# Patient Record
Sex: MALE | Race: WHITE
[De-identification: names, ages, dates, MRNs, and addresses within clinical notes are randomized per-mention and may not be internally consistent; named-entity substitution may affect disease eponyms.]

---

## 2020-06-25 ENCOUNTER — HOSPITAL ENCOUNTER (EMERGENCY)
Dept: HOSPITAL 8 - ED | Age: 20
Discharge: HOME | End: 2020-06-25
Payer: MEDICAID

## 2020-06-25 VITALS — DIASTOLIC BLOOD PRESSURE: 93 MMHG | SYSTOLIC BLOOD PRESSURE: 169 MMHG

## 2020-06-25 VITALS — BODY MASS INDEX: 40.75 KG/M2 | HEIGHT: 69 IN | WEIGHT: 275.14 LBS

## 2020-06-25 DIAGNOSIS — F17.200: ICD-10-CM

## 2020-06-25 DIAGNOSIS — R05: ICD-10-CM

## 2020-06-25 DIAGNOSIS — J02.9: Primary | ICD-10-CM

## 2020-06-25 DIAGNOSIS — R06.02: ICD-10-CM

## 2020-06-25 DIAGNOSIS — R42: ICD-10-CM

## 2020-06-25 DIAGNOSIS — R00.0: ICD-10-CM

## 2020-06-25 PROCEDURE — 87081 CULTURE SCREEN ONLY: CPT

## 2020-06-25 PROCEDURE — 71045 X-RAY EXAM CHEST 1 VIEW: CPT

## 2020-06-25 PROCEDURE — 99285 EMERGENCY DEPT VISIT HI MDM: CPT

## 2020-06-25 PROCEDURE — 93005 ELECTROCARDIOGRAM TRACING: CPT

## 2020-06-25 PROCEDURE — 87880 STREP A ASSAY W/OPTIC: CPT

## 2020-06-25 NOTE — NUR
PT WALKED BACK FROM TRIAGE WITH CHIEF COMPLAINT OF SORE THROAT, HA, NASAL 
CONGESTION,, INITERMITTENT N/V FOR 1 WEEK.

## 2021-07-02 ENCOUNTER — HOSPITAL ENCOUNTER (EMERGENCY)
Facility: MEDICAL CENTER | Age: 21
End: 2021-07-02
Attending: EMERGENCY MEDICINE
Payer: MEDICAID

## 2021-07-02 ENCOUNTER — APPOINTMENT (OUTPATIENT)
Dept: RADIOLOGY | Facility: MEDICAL CENTER | Age: 21
End: 2021-07-02
Attending: EMERGENCY MEDICINE
Payer: MEDICAID

## 2021-07-02 VITALS
HEART RATE: 95 BPM | DIASTOLIC BLOOD PRESSURE: 100 MMHG | TEMPERATURE: 97.7 F | WEIGHT: 276.68 LBS | RESPIRATION RATE: 16 BRPM | SYSTOLIC BLOOD PRESSURE: 154 MMHG | OXYGEN SATURATION: 96 %

## 2021-07-02 DIAGNOSIS — R03.0 ELEVATED BLOOD PRESSURE READING: ICD-10-CM

## 2021-07-02 DIAGNOSIS — E87.6 HYPOKALEMIA: ICD-10-CM

## 2021-07-02 DIAGNOSIS — G47.00 INSOMNIA, UNSPECIFIED TYPE: ICD-10-CM

## 2021-07-02 LAB
ALBUMIN SERPL BCP-MCNC: 4.4 G/DL (ref 3.2–4.9)
ALBUMIN/GLOB SERPL: 1.6 G/DL
ALP SERPL-CCNC: 73 U/L (ref 30–99)
ALT SERPL-CCNC: 53 U/L (ref 2–50)
ANION GAP SERPL CALC-SCNC: 13 MMOL/L (ref 7–16)
AST SERPL-CCNC: 32 U/L (ref 12–45)
BASOPHILS # BLD AUTO: 0.2 % (ref 0–1.8)
BASOPHILS # BLD: 0.02 K/UL (ref 0–0.12)
BILIRUB SERPL-MCNC: 0.3 MG/DL (ref 0.1–1.5)
BUN SERPL-MCNC: 8 MG/DL (ref 8–22)
CALCIUM SERPL-MCNC: 9.6 MG/DL (ref 8.5–10.5)
CHLORIDE SERPL-SCNC: 105 MMOL/L (ref 96–112)
CO2 SERPL-SCNC: 25 MMOL/L (ref 20–33)
CREAT SERPL-MCNC: 0.72 MG/DL (ref 0.5–1.4)
EKG IMPRESSION: NORMAL
EOSINOPHIL # BLD AUTO: 0.08 K/UL (ref 0–0.51)
EOSINOPHIL NFR BLD: 0.7 % (ref 0–6.9)
ERYTHROCYTE [DISTWIDTH] IN BLOOD BY AUTOMATED COUNT: 41.3 FL (ref 35.9–50)
GLOBULIN SER CALC-MCNC: 2.8 G/DL (ref 1.9–3.5)
GLUCOSE SERPL-MCNC: 107 MG/DL (ref 65–99)
HCT VFR BLD AUTO: 44 % (ref 42–52)
HGB BLD-MCNC: 15.1 G/DL (ref 14–18)
IMM GRANULOCYTES # BLD AUTO: 0.06 K/UL (ref 0–0.11)
IMM GRANULOCYTES NFR BLD AUTO: 0.5 % (ref 0–0.9)
LYMPHOCYTES # BLD AUTO: 2.49 K/UL (ref 1–4.8)
LYMPHOCYTES NFR BLD: 22.8 % (ref 22–41)
MCH RBC QN AUTO: 31.1 PG (ref 27–33)
MCHC RBC AUTO-ENTMCNC: 34.3 G/DL (ref 33.7–35.3)
MCV RBC AUTO: 90.7 FL (ref 81.4–97.8)
MONOCYTES # BLD AUTO: 1.07 K/UL (ref 0–0.85)
MONOCYTES NFR BLD AUTO: 9.8 % (ref 0–13.4)
NEUTROPHILS # BLD AUTO: 7.22 K/UL (ref 1.82–7.42)
NEUTROPHILS NFR BLD: 66 % (ref 44–72)
NRBC # BLD AUTO: 0 K/UL
NRBC BLD-RTO: 0 /100 WBC
NT-PROBNP SERPL IA-MCNC: 13 PG/ML (ref 0–125)
PLATELET # BLD AUTO: 288 K/UL (ref 164–446)
PMV BLD AUTO: 10.9 FL (ref 9–12.9)
POTASSIUM SERPL-SCNC: 3.5 MMOL/L (ref 3.6–5.5)
PROT SERPL-MCNC: 7.2 G/DL (ref 6–8.2)
RBC # BLD AUTO: 4.85 M/UL (ref 4.7–6.1)
SODIUM SERPL-SCNC: 143 MMOL/L (ref 135–145)
TROPONIN T SERPL-MCNC: 7 NG/L (ref 6–19)
WBC # BLD AUTO: 10.9 K/UL (ref 4.8–10.8)

## 2021-07-02 PROCEDURE — 84484 ASSAY OF TROPONIN QUANT: CPT

## 2021-07-02 PROCEDURE — 85025 COMPLETE CBC W/AUTO DIFF WBC: CPT

## 2021-07-02 PROCEDURE — 71275 CT ANGIOGRAPHY CHEST: CPT

## 2021-07-02 PROCEDURE — 93005 ELECTROCARDIOGRAM TRACING: CPT | Performed by: EMERGENCY MEDICINE

## 2021-07-02 PROCEDURE — 83880 ASSAY OF NATRIURETIC PEPTIDE: CPT

## 2021-07-02 PROCEDURE — 80053 COMPREHEN METABOLIC PANEL: CPT

## 2021-07-02 PROCEDURE — 99284 EMERGENCY DEPT VISIT MOD MDM: CPT

## 2021-07-02 PROCEDURE — 71045 X-RAY EXAM CHEST 1 VIEW: CPT

## 2021-07-02 PROCEDURE — 700117 HCHG RX CONTRAST REV CODE 255: Performed by: EMERGENCY MEDICINE

## 2021-07-02 RX ADMIN — IOHEXOL 100 ML: 350 INJECTION, SOLUTION INTRAVENOUS at 16:51

## 2021-07-02 ASSESSMENT — LIFESTYLE VARIABLES: DO YOU DRINK ALCOHOL: NO

## 2021-07-02 NOTE — ED TRIAGE NOTES
Chief Complaint   Patient presents with   • Cough     pt states sx for 2 weeks   • Fatigue     pt states he just feels tired all the time     Pt placed in Sr lounge

## 2021-07-02 NOTE — ED PROVIDER NOTES
"ED Provider Note    CHIEF COMPLAINT  Chief Complaint   Patient presents with   • Cough     pt states sx for 2 weeks   • Fatigue     pt states he just feels tired all the time       HPI  Shar Anglin is a 21 y.o. male here with his father who states \"I am okay.\"  Patient's father states the patient has not slept for five or six nights and is up pacing and appears anxious.  The patient states he is fine and wishes to go home.  He states he has been anxious secondary to stress that he does not wish to discuss.  He denies stimulant/drug use.  He admits to marijuana use.  He denies any physical pain or complaints such as chest pain and shortness of breath.  He states he receives injections for psychiatric medications and believes he is due in the next couple of weeks.    Patient denies hallucinations, SI and HI.    ALLERGIES  Allergies   Allergen Reactions   • Cefdinir Rash     omnicef       CURRENT MEDICATIONS  Denies    PAST MEDICAL HISTORY   has a past medical history of Migraine.    SURGICAL HISTORY  patient denies any surgical history    SOCIAL HISTORY  Here with his father  Admits to marijuana use  Denies alcohol, tobacco, and methamphetamine use      REVIEW OF SYSTEMS  See HPI for further details.  All other systems are negative except as above in HPI.    PHYSICAL EXAM  VITAL SIGNS: BP (!) 175/110   Pulse (!) 117   Temp 36.5 °C (97.7 °F) (Temporal)   Resp 12   Wt (!) 126 kg (276 lb 10.8 oz)   SpO2 96%     General:  WDWN male, nontoxic appearing in NAD; A+Ox3; V/S as above; elevated blood pressure, tachycardic at triage but not on my exam  Skin: warm and dry; good color; no rash  HEENT: NCAT; EOMs intact; PERRL; no scleral icterus   Neck: FROM; soft  Cardiovascular: Regular heart rate and rhythm.  No murmurs, rubs, or gallops  Lungs: Clear to auscultation with good air movement bilaterally.  No wheezes, rhonchi, or rales.   Extremities: QUICK x 4; no e/o trauma; no pedal edema  Neurologic: CNs III-XII " grossly intact; speech clear; distal sensation intact; strength 5/5 UE/LEs; gait steady  Psychiatric: Appropriate affect, normal mood    LABS  Results for orders placed or performed during the hospital encounter of 07/02/21   CBC WITH DIFFERENTIAL   Result Value Ref Range    WBC 10.9 (H) 4.8 - 10.8 K/uL    RBC 4.85 4.70 - 6.10 M/uL    Hemoglobin 15.1 14.0 - 18.0 g/dL    Hematocrit 44.0 42.0 - 52.0 %    MCV 90.7 81.4 - 97.8 fL    MCH 31.1 27.0 - 33.0 pg    MCHC 34.3 33.7 - 35.3 g/dL    RDW 41.3 35.9 - 50.0 fL    Platelet Count 288 164 - 446 K/uL    MPV 10.9 9.0 - 12.9 fL    Neutrophils-Polys 66.00 44.00 - 72.00 %    Lymphocytes 22.80 22.00 - 41.00 %    Monocytes 9.80 0.00 - 13.40 %    Eosinophils 0.70 0.00 - 6.90 %    Basophils 0.20 0.00 - 1.80 %    Immature Granulocytes 0.50 0.00 - 0.90 %    Nucleated RBC 0.00 /100 WBC    Neutrophils (Absolute) 7.22 1.82 - 7.42 K/uL    Lymphs (Absolute) 2.49 1.00 - 4.80 K/uL    Monos (Absolute) 1.07 (H) 0.00 - 0.85 K/uL    Eos (Absolute) 0.08 0.00 - 0.51 K/uL    Baso (Absolute) 0.02 0.00 - 0.12 K/uL    Immature Granulocytes (abs) 0.06 0.00 - 0.11 K/uL    NRBC (Absolute) 0.00 K/uL   CMP   Result Value Ref Range    Sodium 143 135 - 145 mmol/L    Potassium 3.5 (L) 3.6 - 5.5 mmol/L    Chloride 105 96 - 112 mmol/L    Co2 25 20 - 33 mmol/L    Anion Gap 13.0 7.0 - 16.0    Glucose 107 (H) 65 - 99 mg/dL    Bun 8 8 - 22 mg/dL    Creatinine 0.72 0.50 - 1.40 mg/dL    Calcium 9.6 8.5 - 10.5 mg/dL    AST(SGOT) 32 12 - 45 U/L    ALT(SGPT) 53 (H) 2 - 50 U/L    Alkaline Phosphatase 73 30 - 99 U/L    Total Bilirubin 0.3 0.1 - 1.5 mg/dL    Albumin 4.4 3.2 - 4.9 g/dL    Total Protein 7.2 6.0 - 8.2 g/dL    Globulin 2.8 1.9 - 3.5 g/dL    A-G Ratio 1.6 g/dL   TROPONIN   Result Value Ref Range    Troponin T 7 6 - 19 ng/L   proBrain Natriuretic Peptide, NT   Result Value Ref Range    NT-proBNP 13 0 - 125 pg/mL   ESTIMATED GFR   Result Value Ref Range    GFR If African American >60 >60 mL/min/1.73 m 2    GFR  If Non African American >60 >60 mL/min/1.73 m 2   EKG   Result Value Ref Range    Report       [facility] Emergency Dept.    Test Date:  2021  Pt Name:    SHAR GRANGER            Department: ER  MRN:        [id number]                      Room:       [facility]  Gender:     Male                         Technician: [id number]  :        2000                   Requested By:DEMOND DEL TORO  Order #:    [id number]                    Reading MD: DEMOND DEL TORO MD    Measurements  Intervals                                Axis  Rate:       96                           P:          35  WV:         148                          QRS:        55  QRSD:       104                          T:          6  QT:         360  QTc:        455    Interpretive Statements  SINUS RHYTHM  No previous ECG available for comparison  t wave inversion III  non specific ST changes laterally  Electronically Signed On 2021 16:19:01 PDT by DEMOND DEL TORO MD       IMAGING  CT-CTA CHEST PULMONARY ARTERY W/ RECONS   Final Result      1.  There is no CT evidence of acute pulmonary embolism.   2.  Negative CT of the chest.            DX-CHEST-PORTABLE (1 VIEW)   Final Result      No acute cardiopulmonary findings.          MEDICAL RECORD  I have reviewed patient's medical record and pertinent results are listed below.      COURSE & MEDICAL DECISION MAKING  I have reviewed any medical record information, laboratory studies and radiographic results as noted.    Shar Granger is a 21 y.o. male who presents complaining of difficulty sleeping.  The patient is unwilling to really answer much of my history questions.  He does deny SI, HI, and hallucinations.  He denies stimulant use such as cocaine and methamphetamines.  His father accompanies him and asks that I give him something to help him sleep.  I advised that I would like to discover and treat the etiology of the insomnia rather than just give a general pill for  insomnia. The patient declined to give us a urine sample while here.  It is unclear why.  We will discharge with return precautions.  I have advised them to be evaluated at [facility] where he has been before.    Appropriate PPE was worn at all times while interacting with the patient.    Protocol labs and CT imaging were ordered before interviewing the patient.  The patient's vitals were concerning to me with elevated blood pressure and tachycardia.  Patient is not hypoxic or febrile.  He is nontoxic-appearing.  He does not wish to be evaluated further or speak to me more than he has.  PE study was negative.  No evidence of failure or cardiomegaly.  Labs demonstrate sodium 143, potassium 3.5, ALT 53, and a mild leukocytosis of 10.9.  BNP and troponin are negative.    I suspect the patient's tachycardia was due to anxiety when he first arrived.  He was not tachycardic on my exam.  Recheck of vitals reveal resolution of tachycardia and improved blood pressure.    Pt's blood pressure was noted to be above 120/80 here in the ER.  Pt was informed and advised to follow up as an outpatient for recheck for possible dx/management of hypertension.      FINAL IMPRESSION  1. Insomnia, unspecified type     2. Elevated blood pressure reading         Electronically signed by: Eliana Blanco M.D., 7/2/2021 3:28 PM

## 2021-07-03 ENCOUNTER — APPOINTMENT (OUTPATIENT)
Dept: RADIOLOGY | Facility: MEDICAL CENTER | Age: 21
End: 2021-07-03
Attending: EMERGENCY MEDICINE
Payer: MEDICAID

## 2021-07-03 ENCOUNTER — HOSPITAL ENCOUNTER (EMERGENCY)
Facility: MEDICAL CENTER | Age: 21
End: 2021-07-03
Attending: EMERGENCY MEDICINE
Payer: MEDICAID

## 2021-07-03 VITALS
HEIGHT: 68 IN | WEIGHT: 276 LBS | HEART RATE: 99 BPM | TEMPERATURE: 98.7 F | OXYGEN SATURATION: 94 % | BODY MASS INDEX: 41.83 KG/M2 | DIASTOLIC BLOOD PRESSURE: 107 MMHG | RESPIRATION RATE: 16 BRPM | SYSTOLIC BLOOD PRESSURE: 158 MMHG

## 2021-07-03 DIAGNOSIS — B35.3 TINEA PEDIS OF LEFT FOOT: ICD-10-CM

## 2021-07-03 DIAGNOSIS — S91.302A AVULSION OF SKIN OF LEFT FOOT, INITIAL ENCOUNTER: ICD-10-CM

## 2021-07-03 DIAGNOSIS — F29 PSYCHOSIS, UNSPECIFIED PSYCHOSIS TYPE (HCC): ICD-10-CM

## 2021-07-03 LAB — POC BREATHALIZER: 0 PERCENT (ref 0–0.01)

## 2021-07-03 PROCEDURE — 99285 EMERGENCY DEPT VISIT HI MDM: CPT

## 2021-07-03 PROCEDURE — 302970 POC BREATHALIZER

## 2021-07-03 PROCEDURE — 302970 POC BREATHALIZER: Performed by: EMERGENCY MEDICINE

## 2021-07-03 PROCEDURE — 73630 X-RAY EXAM OF FOOT: CPT | Mod: LT

## 2021-07-03 RX ORDER — TOLNAFTATE 1 %
AEROSOL, SPRAY (GRAM) TOPICAL
Qty: 15 G | Refills: 0 | Status: SHIPPED | OUTPATIENT
Start: 2021-07-03

## 2021-07-03 RX ORDER — DOXYCYCLINE 100 MG/1
100 CAPSULE ORAL 2 TIMES DAILY
Qty: 10 CAPSULE | Refills: 0 | Status: SHIPPED | OUTPATIENT
Start: 2021-07-03 | End: 2021-07-08

## 2021-07-03 RX ORDER — LORAZEPAM 1 MG/1
1 TABLET ORAL ONCE
Status: DISCONTINUED | OUTPATIENT
Start: 2021-07-03 | End: 2021-07-03 | Stop reason: HOSPADM

## 2021-07-03 ASSESSMENT — FIBROSIS 4 INDEX: FIB4 SCORE: 0.32

## 2021-07-03 NOTE — ED NOTES
Multiple attempts made to obtain breathalyzer and urine per protocol. Pt unable to urinate at this time and is not following instructions to obtain breathalyzer will attempt in a little while.

## 2021-07-03 NOTE — DISCHARGE INSTRUCTIONS
Continue taking your medications    Return to the ER for worsening symptoms.    Your potassium was slightly low today.  You may consider drinking Gatorade or eating a banana daily.  One of your liver function tests was slightly elevated.  This may be due to medications or inflammation.  This should be rechecked by primary care doctor in the next 2 to 4 weeks.    See a primary care doctor to have your blood pressure rechecked.  It was elevated/high here.    Check in with your counselor/psychiatrist and go to an evaluation at [facility] tomorrow.

## 2021-07-03 NOTE — ED PROVIDER NOTES
ED Provider Note    CHIEF COMPLAINT  Chief Complaint   Patient presents with   • Legal 2000     Here from [facility] for medical clearance and legal hold certification. Pt has a accepting DR mary carmen Mendoza. Pt was responding to internal stimuli per report.        HPI  Shar Anglin is a 21 y.o. male who presents to the ER complaining of left foot pain for the last 6 months.  Patient comes to us from [facility] for medical clearance on a legal 2000.  He is responding to internal stimuli.  At this time patient admits he is hearing voices.  He denies suicidal ideation.  He says his foot has been hurting him for the last 6 months.  He was at [facility] yesterday complaining of cough and fatigue.  He had a full work-up including CT scan of the chest to rule out PE as well as blood work, EKG etc.  Patient's work-up was unremarkable and he was discharged home.  Here in the ER he is not any distress.  He is a poor historian.  He says he thinks he might have had some chills.  Otherwise no complaints other than the left foot pain for the last 6 months.  He says he is hearing voices.    REVIEW OF SYSTEMS  See HPI for further details. All other systems are negative.    PAST MEDICAL HISTORY  Past Medical History:   Diagnosis Date   • Migraine        FAMILY HISTORY  History reviewed. No pertinent family history.    SOCIAL HISTORY  Social History     Socioeconomic History   • Marital status: Single     Spouse name: Not on file   • Number of children: Not on file   • Years of education: Not on file   • Highest education level: Not on file   Occupational History   • Not on file   Tobacco Use   • Smoking status: Former Smoker   • Smokeless tobacco: Never Used   Vaping Use   • Vaping Use: Never used   Substance and Sexual Activity   • Alcohol use: Not Currently     Comment: stopped drinking two months ago    • Drug use: Yes     Types: Inhaled     Comment: marijuana    • Sexual activity: Not on file   Other Topics Concern   •  "Not on file   Social History Narrative   • Not on file     Social Determinants of Health     Financial Resource Strain:    • Difficulty of Paying Living Expenses:    Food Insecurity:    • Worried About Running Out of Food in the Last Year:    • Ran Out of Food in the Last Year:    Transportation Needs:    • Lack of Transportation (Medical):    • Lack of Transportation (Non-Medical):    Physical Activity:    • Days of Exercise per Week:    • Minutes of Exercise per Session:    Stress:    • Feeling of Stress :    Social Connections:    • Frequency of Communication with Friends and Family:    • Frequency of Social Gatherings with Friends and Family:    • Attends [employer] Services:    • Active Member of Clubs or Organizations:    • Attends Club or Organization Meetings:    • Marital Status:    Intimate Partner Violence:    • Fear of Current or Ex-Partner:    • Emotionally Abused:    • Physically Abused:    • Sexually Abused:        SURGICAL HISTORY  History reviewed. No pertinent surgical history.    CURRENT MEDICATIONS  Home Medications    **Home medications have not yet been reviewed for this encounter**         ALLERGIES  Allergies   Allergen Reactions   • Cefdinir Rash     omnicef       PHYSICAL EXAM  VITAL SIGNS: BP (!) 179/109   Pulse (!) 111   Temp 37.3 °C (99.1 °F) (Temporal)   Resp 16   Ht 1.727 m (5' 8\")   Wt (!) 125 kg (276 lb)   SpO2 99%   BMI 41.97 kg/m²      Constitutional: Well developed, well nourished; No acute distress; Non-toxic appearance.   HENT: Normocephalic, atraumatic; Bilateral external ears normal; oropharyngeal examination deferred due to COVID-19 outbreak and lack of oropharyngeal complaint  Eyes: PERRL, EOMI, Conjunctiva normal. No discharge.   Neck:  Supple, nontender midline; No stridor; No nuchal rigidity.   Lymphatic: No cervical lymphadenopathy noted.   Cardiovascular: Mildly tachycardic rate and rhythm without murmurs, rubs, or gallop.   Thorax & Lungs: No respiratory " distress, breath sounds clear to auscultation bilaterally without wheezing, rales or rhonchi. Nontender chest wall. No crepitus or subcutaneous air  Abdomen: Soft, nontender, bowel sounds normal. No obvious masses; No pulsatile masses; no rebound, guarding, or peritoneal signs.   Skin: Good color; warm and dry without rash or petechia.  Back: Nontender, No CVA tenderness. .   Extremities: Distal radial, dorsalis pedis, posterior tibial pulses are equal bilaterally; No edema; Nontender calves or saphenous, No cyanosis, No clubbing.  Left foot: There is some dry scaliness to the plantar surface of the foot in the area of the second third and fourth MTP joints and toes.  There is some skin breakdown to the plantar surface of the foot.  No signs of infection.  No purulence.  2+ DP and PT pulses equal bilaterally.  Neurologic: Alert & oriented x 4, clear speech, flat affect.      RADIOLOGY/PROCEDURES  DX-FOOT-COMPLETE 3+ LEFT   Final Result      No acute osseous abnormality. No radiographic evidence of osteomyelitis.          COURSE & MEDICAL DECISION MAKING  Pertinent Labs & Imaging studies reviewed. (See chart for details)    Patient presents to the ER complaining of left foot pain for the last 6 months.  It looks like patient has perhaps a tinea pedis infection at the plantar surface of his foot which has caused some skin breakdown on the ball the foot.  No signs of infection.  I think would be helpful to keep the wounds clean and covered.  He try to walk on the heel of his foot in order to keep the pressure off of the ball of the foot.  We will dress the foot.  I will send him home with some Lotrimin powder in order to help with the fungal infection and then place him on some Doxy in the event that the wound is starting to get infected, although at this time no overt signs of infection.  X-ray reveals no osseous abnormality and no signs of osteomyelitis.  Labs from yesterday reveal he had normal blood sugar.  This is  not a diabetic foot wound.  He is on a legal 2000.  He is been accepted back to [facility].  He had a full work-up yesterday with blood work, CT scan of the chest, EKG, etc.  Everything looked normal.  At this time I think he is medically stable for psychiatric evaluation/treatment at [facility].      Patient arrives on a legal 2000.  I completed the legal 2000.  Patient is medically stable for psychiatric evaluation.    I verified that the patient was wearing a mask and I was wearing appropriate PPE every time I entered the room. The patient's mask was on the patient at all times during my encounter     FINAL IMPRESSION  1. Tinea pedis of left foot Acute         This dictation has been created using voice recognition software. The accuracy of the dictation is limited by the abilities of the software. I expect there may be some errors of grammar and possibly content. I made every attempt to manually correct the errors within my dictation. However, errors related to voice recognition software may still exist and should be interpreted within the appropriate context.    Electronically signed by: Sole Dang M.D., 7/3/2021 3:54 PM

## 2021-07-03 NOTE — ED TRIAGE NOTES
"Chief Complaint   Patient presents with   • Legal 2000     Here from [facility] for medical clearance and legal hold certification. Pt has a accepting . Pt was responding to internal stimuli per report.      BP (!) 179/109   Pulse (!) 111   Temp 37.3 °C (99.1 °F) (Temporal)   Resp 16   Ht 1.727 m (5' 8\")   Wt (!) 125 kg (276 lb)   SpO2 99%   BMI 41.97 kg/m²     Parents are currently sitting in waiting area. They have been made aware that at this time he may not have visitors.   "

## 2021-07-04 NOTE — ED NOTES
Report to [facility]; pt ambulatory with steady gait; belongings sent with [facility]. RN had previously made family aware pt to be transferred to [facility]. Report to isabela at [facility]

## 2021-07-04 NOTE — ED NOTES
RN gave update to pt's mother and family via phone [phone] that pt will be transferred back to [facility]

## 2021-07-04 NOTE — ED NOTES
Spoke with Fide at [facility] to arrange transport to [facility]. Patient authorized for transport. Fide will call back with LON